# Patient Record
Sex: MALE | Race: OTHER | Employment: FULL TIME | URBAN - METROPOLITAN AREA
[De-identification: names, ages, dates, MRNs, and addresses within clinical notes are randomized per-mention and may not be internally consistent; named-entity substitution may affect disease eponyms.]

---

## 2019-04-24 PROBLEM — K64.9 HEMORRHOIDS: Status: ACTIVE | Noted: 2019-04-24

## 2019-04-24 PROBLEM — Z12.11 SPECIAL SCREENING FOR MALIGNANT NEOPLASMS, COLON: Status: ACTIVE | Noted: 2019-04-24

## 2019-04-24 PROBLEM — Z83.71 FAMILY HISTORY OF COLONIC POLYPS: Status: ACTIVE | Noted: 2019-04-24

## 2019-04-24 PROBLEM — K57.30 DIVERTICULOSIS OF COLON: Status: ACTIVE | Noted: 2019-04-24

## 2019-05-13 PROBLEM — R07.9 CHEST PAIN: Status: ACTIVE | Noted: 2019-05-13

## 2019-05-15 PROBLEM — I34.0 NON-RHEUMATIC MITRAL REGURGITATION: Status: ACTIVE | Noted: 2019-05-15

## 2019-05-15 PROBLEM — I48.92 ATRIAL FLUTTER (HCC): Status: ACTIVE | Noted: 2019-05-15

## 2019-05-17 ENCOUNTER — HOSPITAL ENCOUNTER (OUTPATIENT)
Dept: CARDIAC CATH/INVASIVE PROCEDURES | Age: 69
Discharge: HOME | End: 2019-05-17
Attending: INTERNAL MEDICINE | Admitting: INTERNAL MEDICINE
Payer: COMMERCIAL

## 2019-05-17 VITALS
OXYGEN SATURATION: 98 % | BODY MASS INDEX: 25.44 KG/M2 | WEIGHT: 149 LBS | DIASTOLIC BLOOD PRESSURE: 80 MMHG | HEIGHT: 64 IN | RESPIRATION RATE: 17 BRPM | SYSTOLIC BLOOD PRESSURE: 123 MMHG | HEART RATE: 68 BPM

## 2019-05-17 LAB
ANION GAP SERPL CALC-SCNC: 5 MMOL/L (ref 7–16)
ATRIAL RATE: 293 BPM
BUN SERPL-MCNC: 15 MG/DL (ref 8–23)
CALCIUM SERPL-MCNC: 8.5 MG/DL (ref 8.3–10.4)
CALCULATED P AXIS, ECG09: -2 DEGREES
CALCULATED R AXIS, ECG10: 44 DEGREES
CALCULATED T AXIS, ECG11: 56 DEGREES
CHLORIDE SERPL-SCNC: 107 MMOL/L (ref 98–107)
CO2 SERPL-SCNC: 30 MMOL/L (ref 21–32)
CREAT SERPL-MCNC: 0.83 MG/DL (ref 0.8–1.5)
DIAGNOSIS, 93000: NORMAL
ERYTHROCYTE [DISTWIDTH] IN BLOOD BY AUTOMATED COUNT: 15.3 % (ref 11.9–14.6)
GLUCOSE SERPL-MCNC: 88 MG/DL (ref 65–100)
HCT VFR BLD AUTO: 47.5 % (ref 41.1–50.3)
HGB BLD-MCNC: 15.4 G/DL (ref 13.6–17.2)
INR PPP: 1
MAGNESIUM SERPL-MCNC: 2.4 MG/DL (ref 1.8–2.4)
MCH RBC QN AUTO: 28.5 PG (ref 26.1–32.9)
MCHC RBC AUTO-ENTMCNC: 32.4 G/DL (ref 31.4–35)
MCV RBC AUTO: 88 FL (ref 79.6–97.8)
NRBC # BLD: 0 K/UL (ref 0–0.2)
PLATELET # BLD AUTO: 270 K/UL (ref 150–450)
PMV BLD AUTO: 10.9 FL (ref 9.4–12.3)
POTASSIUM SERPL-SCNC: 3.8 MMOL/L (ref 3.5–5.1)
PROTHROMBIN TIME: 12.8 SEC (ref 11.7–14.5)
Q-T INTERVAL, ECG07: 410 MS
QRS DURATION, ECG06: 88 MS
QTC CALCULATION (BEZET), ECG08: 419 MS
RBC # BLD AUTO: 5.4 M/UL (ref 4.23–5.6)
SODIUM SERPL-SCNC: 142 MMOL/L (ref 136–145)
VENTRICULAR RATE, ECG03: 63 BPM
WBC # BLD AUTO: 8.1 K/UL (ref 4.3–11.1)

## 2019-05-17 PROCEDURE — 85610 PROTHROMBIN TIME: CPT

## 2019-05-17 PROCEDURE — 74011250637 HC RX REV CODE- 250/637: Performed by: INTERNAL MEDICINE

## 2019-05-17 PROCEDURE — 74011000250 HC RX REV CODE- 250: Performed by: INTERNAL MEDICINE

## 2019-05-17 PROCEDURE — 85027 COMPLETE CBC AUTOMATED: CPT

## 2019-05-17 PROCEDURE — 99153 MOD SED SAME PHYS/QHP EA: CPT

## 2019-05-17 PROCEDURE — 93312 ECHO TRANSESOPHAGEAL: CPT

## 2019-05-17 PROCEDURE — 74011250636 HC RX REV CODE- 250/636: Performed by: INTERNAL MEDICINE

## 2019-05-17 PROCEDURE — 93460 R&L HRT ART/VENTRICLE ANGIO: CPT

## 2019-05-17 PROCEDURE — 80048 BASIC METABOLIC PNL TOTAL CA: CPT

## 2019-05-17 PROCEDURE — 93005 ELECTROCARDIOGRAM TRACING: CPT | Performed by: INTERNAL MEDICINE

## 2019-05-17 PROCEDURE — 93458 L HRT ARTERY/VENTRICLE ANGIO: CPT

## 2019-05-17 PROCEDURE — 74011250636 HC RX REV CODE- 250/636

## 2019-05-17 PROCEDURE — 74011636320 HC RX REV CODE- 636/320: Performed by: INTERNAL MEDICINE

## 2019-05-17 PROCEDURE — 99152 MOD SED SAME PHYS/QHP 5/>YRS: CPT

## 2019-05-17 PROCEDURE — 83735 ASSAY OF MAGNESIUM: CPT

## 2019-05-17 RX ORDER — SODIUM CHLORIDE 9 MG/ML
75 INJECTION, SOLUTION INTRAVENOUS CONTINUOUS
Status: DISCONTINUED | OUTPATIENT
Start: 2019-05-17 | End: 2019-05-17 | Stop reason: HOSPADM

## 2019-05-17 RX ORDER — SODIUM CHLORIDE 0.9 % (FLUSH) 0.9 %
5-40 SYRINGE (ML) INJECTION EVERY 8 HOURS
Status: DISCONTINUED | OUTPATIENT
Start: 2019-05-17 | End: 2019-05-17 | Stop reason: HOSPADM

## 2019-05-17 RX ORDER — LIDOCAINE HYDROCHLORIDE 10 MG/ML
2-20 INJECTION INFILTRATION; PERINEURAL
Status: DISCONTINUED | OUTPATIENT
Start: 2019-05-17 | End: 2019-05-17 | Stop reason: HOSPADM

## 2019-05-17 RX ORDER — FENTANYL CITRATE 50 UG/ML
25-100 INJECTION, SOLUTION INTRAMUSCULAR; INTRAVENOUS
Status: DISCONTINUED | OUTPATIENT
Start: 2019-05-17 | End: 2019-05-17 | Stop reason: HOSPADM

## 2019-05-17 RX ORDER — MIDAZOLAM HYDROCHLORIDE 1 MG/ML
.5-2 INJECTION, SOLUTION INTRAMUSCULAR; INTRAVENOUS
Status: DISCONTINUED | OUTPATIENT
Start: 2019-05-17 | End: 2019-05-17 | Stop reason: HOSPADM

## 2019-05-17 RX ORDER — SODIUM CHLORIDE 0.9 % (FLUSH) 0.9 %
5 SYRINGE (ML) INJECTION AS NEEDED
Status: DISCONTINUED | OUTPATIENT
Start: 2019-05-17 | End: 2019-05-17 | Stop reason: HOSPADM

## 2019-05-17 RX ORDER — SODIUM CHLORIDE 0.9 % (FLUSH) 0.9 %
5-40 SYRINGE (ML) INJECTION AS NEEDED
Status: DISCONTINUED | OUTPATIENT
Start: 2019-05-17 | End: 2019-05-17 | Stop reason: HOSPADM

## 2019-05-17 RX ORDER — GUAIFENESIN 100 MG/5ML
324 LIQUID (ML) ORAL ONCE
Status: COMPLETED | OUTPATIENT
Start: 2019-05-17 | End: 2019-05-17

## 2019-05-17 RX ORDER — HEPARIN SODIUM 200 [USP'U]/100ML
2 INJECTION, SOLUTION INTRAVENOUS CONTINUOUS
Status: DISCONTINUED | OUTPATIENT
Start: 2019-05-17 | End: 2019-05-17 | Stop reason: HOSPADM

## 2019-05-17 RX ADMIN — LIDOCAINE HYDROCHLORIDE 4 ML: 10 INJECTION, SOLUTION INFILTRATION; PERINEURAL at 13:15

## 2019-05-17 RX ADMIN — LIDOCAINE HYDROCHLORIDE 5 ML: 10 INJECTION, SOLUTION INFILTRATION; PERINEURAL at 13:21

## 2019-05-17 RX ADMIN — HEPARIN SODIUM 2 ML/HR: 5000 INJECTION, SOLUTION INTRAVENOUS; SUBCUTANEOUS at 13:11

## 2019-05-17 RX ADMIN — VERAPAMIL HYDROCHLORIDE 2 ML: 2.5 INJECTION, SOLUTION INTRAVENOUS at 13:22

## 2019-05-17 RX ADMIN — MIDAZOLAM HYDROCHLORIDE 1 MG: 1 INJECTION, SOLUTION INTRAMUSCULAR; INTRAVENOUS at 13:23

## 2019-05-17 RX ADMIN — MIDAZOLAM HYDROCHLORIDE 1 MG: 1 INJECTION, SOLUTION INTRAMUSCULAR; INTRAVENOUS at 12:31

## 2019-05-17 RX ADMIN — MIDAZOLAM HYDROCHLORIDE 1 MG: 1 INJECTION, SOLUTION INTRAMUSCULAR; INTRAVENOUS at 12:39

## 2019-05-17 RX ADMIN — IOPAMIDOL 110 ML: 755 INJECTION, SOLUTION INTRAVENOUS at 13:46

## 2019-05-17 RX ADMIN — FENTANYL CITRATE 25 MCG: 50 INJECTION, SOLUTION INTRAMUSCULAR; INTRAVENOUS at 13:23

## 2019-05-17 RX ADMIN — FENTANYL CITRATE 25 MCG: 50 INJECTION, SOLUTION INTRAMUSCULAR; INTRAVENOUS at 12:31

## 2019-05-17 RX ADMIN — MIDAZOLAM HYDROCHLORIDE 2 MG: 1 INJECTION, SOLUTION INTRAMUSCULAR; INTRAVENOUS at 12:28

## 2019-05-17 RX ADMIN — FENTANYL CITRATE 25 MCG: 50 INJECTION, SOLUTION INTRAMUSCULAR; INTRAVENOUS at 12:28

## 2019-05-17 RX ADMIN — SODIUM CHLORIDE 75 ML/HR: 900 INJECTION, SOLUTION INTRAVENOUS at 10:46

## 2019-05-17 RX ADMIN — ASPIRIN 81 MG 324 MG: 81 TABLET ORAL at 10:48

## 2019-05-17 NOTE — PROGRESS NOTES
Patient received to [address] [place] [address] room # 10  Ambulatory from [facility]. Patient scheduled for ROB/ LHC today with Dr Gayathri Lucero. Procedure reviewed & questions answered, voiced good understanding consent obtained & placed on chart. All medications and medical history reviewed. Will prep patient per orders. Patient & family updated on plan of care. The patient has a fraility score of 3-MANAGING WELL, based on ability to perform ADLS by self

## 2019-05-17 NOTE — PROCEDURES
Brief Cardiac Procedure Note    Patient: Yasmine Lua MRN: [id number]  SSN: [id number]    YOB: 1950  Age: 81 y.o. Sex: male      Date of Procedure: 5/17/2019     Pre-procedure Diagnosis: Mitral Valve Disorder    Post-procedure Diagnosis: Mitral Valve Disorder    Reason for Procedure: Valvular Disease    Procedure: Transesophageal Echocardiogram    Brief Description of Procedure: ROB    Performed By: Julia Ureña MD     Assistants: NONE    Anesthesia: Moderate Sedation    Estimated Blood Loss: Less than 10 mL      Specimens: None    Implants: None    Findings:   LV LOW NORMAL  TRACE AI AND TR  MODERATE VERY ECCENTRIC ANTEROMEDIALLY DIRECTED MR WITH RUPTURED CHORD BUT NO FLAIL LEAFLET NOTED, MR MODERATE AT MOST WITH SEDATION  PISA CALCULATIONS PENDING  NO EFFUSION  AO NORMAL    Complications: None    Recommendations: Continue medical therapy.     Signed By: Julia Ureña MD     May 17, 2019

## 2019-05-17 NOTE — PROGRESS NOTES
7 fr sheath pulled manual pressure applied. Hemostasis achieved. No bleeding or hematoma. Gauze and tegaderm applied. Right wrist no bleeding or hematoma. 2ml air removed from band

## 2019-05-17 NOTE — PROGRESS NOTES
TRANSFER - OUT REPORT: 
 
ROB Dr Leida Giraldo Numbed approx 1220 Versed 4 mg Fentanyl 50 mcg Pt tolerated well Pt is alert to voice denies complaints Verbal report given to Elsie(name) on [facility]  being transferred to [facility]) for ordered procedure Report consisted of patients Situation, Background, Assessment and  
Recommendations(SBAR). Information from the following report(s) SBAR and Procedure Summary was reviewed with the receiving nurse. Lines:  
Peripheral IV 05/17/19 Right Antecubital (Active) Peripheral IV 05/17/19 Left Antecubital (Active) Opportunity for questions and clarification was provided.

## 2019-05-17 NOTE — PROCEDURES
Brief Cardiac Procedure Note    Patient: Riley Nix MRN: [id number]  SSN: [id number]    YOB: 1950  Age: 81 y.o. Sex: male      Date of Procedure: 5/17/2019     Pre-procedure Diagnosis: Mitral Valve Disorder    Post-procedure Diagnosis: Mitral Valve Disorder    Reason for Procedure: Valvular Disease    Procedure: Right and Left Heart Catheterization    Brief Description of Procedure: LHC/RHC/ROB    Performed By: Katy Verde MD     Assistants: NONE    Anesthesia: Moderate Sedation    Estimated Blood Loss: Less than 10 mL      Specimens: None    Implants: None    Findings:   ROB WITH POSTERIOR MV CHORD RUPTURE, MODERATE VERY ECCENTRIC ANTEROMEDIALLY DIRECTED MR (SEE FULL REPORT)    RHC UNREMARKABLE (SEE FULL REPORT)  LHC :  EF NORMAL, MODERATE MR, NO AV GRADIENT  CORS MILD DIFFUSE DISEASE, SLOW FLOW CONSISTENT WITH ENDOTHELIAL DYSFUNCTION  RCA LARGE ~ 5-6mm WITH MID SMOOTH 50%, PLB WITH FOCAL MID 50% NAPKIN RING  MILD IRREGS OTHERWISE    RIGHT RADIAL ARTERY  RIGHT GROIN VEIN WITH SITE RITE, SINGLE STICK    Complications: None    Recommendations: Continue medical therapy.     Signed By: Katy Verde MD     May 17, 2019

## 2019-05-17 NOTE — DISCHARGE INSTRUCTIONS

## 2019-05-18 NOTE — PROCEDURES
[facility]  CARDIAC CATH    Name:  Kierra Wiseman  MR#:  [id number]  :  1950  ACCOUNT #:  [de-identified]  DATE OF SERVICE:  2019      REFERRING PHYSICIAN:  Kya Adams MD    PRIMARY CARE PHYSICIAN:  Adriana Whaley DO    PREOPERATIVE DIAGNOSIS:  Persistent typical atrial flutter with new diagnosis of moderate to severe mitral regurgitation. POSTOPERATIVE DIAGNOSIS:  Ruptured mitral valve posterior leaflet cord with moderate mitral regurgitation and mild to moderate coronary disease. PROCEDURE PERFORMED:  Transesophageal echo, left heart catheterization, right heart catheterization. SURGEON:  Anjel Chinchilla MD    ASSISTANT:  None. ANESTHESIA:  The patient was sedated by Maureen Blunt with a frailty score of 3 using 1 mg Versed, 25 mcg fentanyl and monitored from 12:28 to 01:47 p.m. COMPLICATIONS:  None. SPECIMENS:  None. ESTIMATED BLOOD LOSS:  Less than 3 mL. IMPLANTS:  None. PROCEDURE TECHNIQUE:  After transesophageal echo was performed demonstrating a probable posterior mitral leaflet ruptured cordal structure with moderate very eccentric anteromedially directed mitral regurgitation, the patient was brought to the cath lab, prepped and draped in the usual fashion. A 6-Guatemalan sheath was advanced into the right radial artery via the micropuncture modified Seldinger technique and a Site-Rite was used to localize the right femoral vein with a single stick inserting a 7-Guatemalan vascular sheath via modified Seldinger technique. Left heart catheterization was performed using standard catheters without complication. Manual pressure will be applied to both access sites via protocol. For a full transesophageal echo report, please see the dictation in the computer. RIGHT HEART CATHETERIZATION RESULTS:  Pulmonary artery saturation 68%. Right atrial saturation 68%. Aortic saturation 95%. Cardiac output 3.6 liters per minute.   Cardiac index 2.1 liters per minute per meter squared. Yamileth cardiac output 3.7 liters per minute, Yamileth cardiac index 2.1 liters per minute per meter squared. Pulmonary capillary wedge pressure end-expiration 15. Pulmonary artery pressure 33/18 with a mean of 23. Right ventricle 33/12. Right atrium 10-12. LEFT HEART CATHETERIZATION RESULTS:  Left ventricle 100/10-13, aorta 100/70. Left ventriculogram reveals normal left ventricular regional wall motion with an ejection fraction greater than 60%. There is moderate mitral regurgitation. There is no aortic valve gradient on catheter pullback and left ventricular end-diastolic pressure is normal.    Coronary anatomy:  Of note, there is sluggish flow throughout the entire epicardial coronary tree consistent with endothelial dysfunction. The left main has mild irregularity dividing into an LAD and circumflex in the usual fashion. The LAD wraps around the apex and supplies a high first diagonal.  There are mild luminal irregularities throughout the entire LAD and diagonal distribution up to 10%. The circumflex is a moderate caliber system which gives off a high first obtuse marginal branch and then a bifurcating second obtuse marginal branch. There are minimal luminal irregularities throughout. There is a small ramus intermedius branch with proximal to mid 30% irregularity. The right coronary is a large anatomically dominant vessel which is at least 5-6 mm in diameter. There is a focal smooth 40-50% napkin-ring narrowing in the midportion of the right coronary proper, however, the lumen at the site of the stenosis is still probably 3 mm in diameter at least.  This is not flow limiting. The remainder of the right coronary has minimal irregularities. There is a small posterior descending branch which has minimal disease. There is a very large posterolateral branch which has mild ectasia.   There is a focal napkin-ring 40-50% mid vessel lesion but again, this does not appear to be flow limiting in multiple views and best left to medical therapy. CONCLUSIONS:  1. Ruptured mitral valve cordal structure with anteromedially directed moderate mitral regurgitation by transesophageal echo. 2.  Mild-to-moderate focal disease in the mid RCA and mid posterior descending branch of the right coronary as described above. 3.  Preserved ejection fraction.       Iraida Jose MD      [id number]  D:  05/17/2019 14:05  T:  05/17/2019 14:16  JOB #:  [id number]  CC:  MD Frankie Lieberman DO

## 2019-07-22 ENCOUNTER — ANESTHESIA EVENT (OUTPATIENT)
Dept: SURGERY | Age: 69
End: 2019-07-22
Payer: COMMERCIAL

## 2019-07-22 RX ORDER — SODIUM CHLORIDE 9 MG/ML
50 INJECTION, SOLUTION INTRAVENOUS CONTINUOUS
Status: CANCELLED | OUTPATIENT
Start: 2019-07-22

## 2019-07-22 RX ORDER — HYDROCODONE BITARTRATE AND ACETAMINOPHEN 5; 325 MG/1; MG/1
1 TABLET ORAL AS NEEDED
Status: CANCELLED | OUTPATIENT
Start: 2019-07-22

## 2019-07-22 RX ORDER — ACETAMINOPHEN 500 MG
1000 TABLET ORAL
Status: CANCELLED | OUTPATIENT
Start: 2019-07-22

## 2019-07-22 RX ORDER — SODIUM CHLORIDE, SODIUM LACTATE, POTASSIUM CHLORIDE, CALCIUM CHLORIDE 600; 310; 30; 20 MG/100ML; MG/100ML; MG/100ML; MG/100ML
150 INJECTION, SOLUTION INTRAVENOUS CONTINUOUS
Status: CANCELLED | OUTPATIENT
Start: 2019-07-22

## 2019-07-22 RX ORDER — HYDROMORPHONE HYDROCHLORIDE 2 MG/ML
0.5 INJECTION, SOLUTION INTRAMUSCULAR; INTRAVENOUS; SUBCUTANEOUS
Status: CANCELLED | OUTPATIENT
Start: 2019-07-22

## 2019-07-23 ENCOUNTER — HOSPITAL ENCOUNTER (OUTPATIENT)
Age: 69
Setting detail: OUTPATIENT SURGERY
Discharge: HOME | End: 2019-07-23
Attending: INTERNAL MEDICINE | Admitting: INTERNAL MEDICINE
Payer: COMMERCIAL

## 2019-07-23 ENCOUNTER — ANESTHESIA (OUTPATIENT)
Dept: SURGERY | Age: 69
End: 2019-07-23
Payer: COMMERCIAL

## 2019-07-23 ENCOUNTER — HOSPITAL ENCOUNTER (OUTPATIENT)
Dept: CARDIAC CATH/INVASIVE PROCEDURES | Age: 69
Discharge: HOME | End: 2019-07-23
Payer: COMMERCIAL

## 2019-07-23 VITALS
OXYGEN SATURATION: 98 % | TEMPERATURE: 98 F | RESPIRATION RATE: 13 BRPM | WEIGHT: 148 LBS | SYSTOLIC BLOOD PRESSURE: 145 MMHG | DIASTOLIC BLOOD PRESSURE: 70 MMHG | HEIGHT: 64 IN | BODY MASS INDEX: 25.27 KG/M2 | HEART RATE: 88 BPM

## 2019-07-23 LAB
ANION GAP SERPL CALC-SCNC: 7 MMOL/L (ref 7–16)
ATRIAL RATE: 284 BPM
ATRIAL RATE: 81 BPM
BUN SERPL-MCNC: 15 MG/DL (ref 8–23)
CALCIUM SERPL-MCNC: 8.5 MG/DL (ref 8.3–10.4)
CALCULATED P AXIS, ECG09: 70 DEGREES
CALCULATED P AXIS, ECG09: 80 DEGREES
CALCULATED R AXIS, ECG10: 55 DEGREES
CALCULATED R AXIS, ECG10: 7 DEGREES
CALCULATED T AXIS, ECG11: 62 DEGREES
CALCULATED T AXIS, ECG11: 70 DEGREES
CHLORIDE SERPL-SCNC: 108 MMOL/L (ref 98–107)
CO2 SERPL-SCNC: 29 MMOL/L (ref 21–32)
CREAT SERPL-MCNC: 0.9 MG/DL (ref 0.8–1.5)
DIAGNOSIS, 93000: NORMAL
DIAGNOSIS, 93000: NORMAL
ERYTHROCYTE [DISTWIDTH] IN BLOOD BY AUTOMATED COUNT: 13.8 % (ref 11.9–14.6)
GLUCOSE SERPL-MCNC: 81 MG/DL (ref 65–100)
HCT VFR BLD AUTO: 50.8 % (ref 41.1–50.3)
HGB BLD-MCNC: 16.1 G/DL (ref 13.6–17.2)
INR PPP: 1.1
MAGNESIUM SERPL-MCNC: 2.3 MG/DL (ref 1.8–2.4)
MCH RBC QN AUTO: 28.5 PG (ref 26.1–32.9)
MCHC RBC AUTO-ENTMCNC: 31.7 G/DL (ref 31.4–35)
MCV RBC AUTO: 90.1 FL (ref 79.6–97.8)
NRBC # BLD: 0 K/UL (ref 0–0.2)
P-R INTERVAL, ECG05: 226 MS
PLATELET # BLD AUTO: 232 K/UL (ref 150–450)
PMV BLD AUTO: 11.7 FL (ref 9.4–12.3)
POTASSIUM SERPL-SCNC: 3.8 MMOL/L (ref 3.5–5.1)
PROTHROMBIN TIME: 13.7 SEC (ref 11.7–14.5)
Q-T INTERVAL, ECG07: 378 MS
Q-T INTERVAL, ECG07: 398 MS
QRS DURATION, ECG06: 86 MS
QRS DURATION, ECG06: 90 MS
QTC CALCULATION (BEZET), ECG08: 432 MS
QTC CALCULATION (BEZET), ECG08: 439 MS
RBC # BLD AUTO: 5.64 M/UL (ref 4.23–5.6)
SODIUM SERPL-SCNC: 144 MMOL/L (ref 136–145)
VENTRICULAR RATE, ECG03: 71 BPM
VENTRICULAR RATE, ECG03: 81 BPM
WBC # BLD AUTO: 7.4 K/UL (ref 4.3–11.1)

## 2019-07-23 PROCEDURE — 93005 ELECTROCARDIOGRAM TRACING: CPT | Performed by: INTERNAL MEDICINE

## 2019-07-23 PROCEDURE — 85027 COMPLETE CBC AUTOMATED: CPT

## 2019-07-23 PROCEDURE — 74011250637 HC RX REV CODE- 250/637: Performed by: ANESTHESIOLOGY

## 2019-07-23 PROCEDURE — 77030035291 HC TBNG PMP SMARTABLATE J&J -B

## 2019-07-23 PROCEDURE — 77030027107 HC PTCH EXT REF CARTO3 J&J -F

## 2019-07-23 PROCEDURE — 83735 ASSAY OF MAGNESIUM: CPT

## 2019-07-23 PROCEDURE — C1732 CATH, EP, DIAG/ABL, 3D/VECT: HCPCS

## 2019-07-23 PROCEDURE — 74011250636 HC RX REV CODE- 250/636: Performed by: INTERNAL MEDICINE

## 2019-07-23 PROCEDURE — 74011250636 HC RX REV CODE- 250/636

## 2019-07-23 PROCEDURE — C1894 INTRO/SHEATH, NON-LASER: HCPCS

## 2019-07-23 PROCEDURE — 93653 COMPRE EP EVAL TX SVT: CPT

## 2019-07-23 PROCEDURE — 74011250637 HC RX REV CODE- 250/637: Performed by: INTERNAL MEDICINE

## 2019-07-23 PROCEDURE — 93312 ECHO TRANSESOPHAGEAL: CPT

## 2019-07-23 PROCEDURE — 85610 PROTHROMBIN TIME: CPT

## 2019-07-23 PROCEDURE — 93613 INTRACARDIAC EPHYS 3D MAPG: CPT

## 2019-07-23 PROCEDURE — 76060000033 HC ANESTHESIA 1 TO 1.5 HR: Performed by: INTERNAL MEDICINE

## 2019-07-23 PROCEDURE — 93621 COMP EP EVL L PAC&REC C SINS: CPT

## 2019-07-23 PROCEDURE — 80048 BASIC METABOLIC PNL TOTAL CA: CPT

## 2019-07-23 RX ORDER — HEPARIN SODIUM 200 [USP'U]/100ML
1500 INJECTION, SOLUTION INTRAVENOUS AS NEEDED
Status: DISCONTINUED | OUTPATIENT
Start: 2019-07-23 | End: 2019-07-23 | Stop reason: HOSPADM

## 2019-07-23 RX ORDER — LIDOCAINE HYDROCHLORIDE 10 MG/ML
0.1 INJECTION INFILTRATION; PERINEURAL AS NEEDED
Status: DISCONTINUED | OUTPATIENT
Start: 2019-07-23 | End: 2019-07-23 | Stop reason: HOSPADM

## 2019-07-23 RX ORDER — ACETAMINOPHEN 325 MG/1
650 TABLET ORAL
Status: DISCONTINUED | OUTPATIENT
Start: 2019-07-23 | End: 2019-07-23 | Stop reason: HOSPADM

## 2019-07-23 RX ORDER — DILTIAZEM HYDROCHLORIDE 120 MG/1
120 CAPSULE, COATED, EXTENDED RELEASE ORAL ONCE
Status: COMPLETED | OUTPATIENT
Start: 2019-07-23 | End: 2019-07-23

## 2019-07-23 RX ORDER — SODIUM CHLORIDE, SODIUM LACTATE, POTASSIUM CHLORIDE, CALCIUM CHLORIDE 600; 310; 30; 20 MG/100ML; MG/100ML; MG/100ML; MG/100ML
INJECTION, SOLUTION INTRAVENOUS
Status: DISCONTINUED | OUTPATIENT
Start: 2019-07-23 | End: 2019-07-23 | Stop reason: HOSPADM

## 2019-07-23 RX ORDER — HEPARIN SODIUM 1000 [USP'U]/ML
INJECTION, SOLUTION INTRAVENOUS; SUBCUTANEOUS AS NEEDED
Status: DISCONTINUED | OUTPATIENT
Start: 2019-07-23 | End: 2019-07-23 | Stop reason: HOSPADM

## 2019-07-23 RX ORDER — FENTANYL CITRATE 50 UG/ML
100 INJECTION, SOLUTION INTRAMUSCULAR; INTRAVENOUS ONCE
Status: DISCONTINUED | OUTPATIENT
Start: 2019-07-23 | End: 2019-07-23 | Stop reason: HOSPADM

## 2019-07-23 RX ORDER — LIDOCAINE HYDROCHLORIDE 20 MG/ML
INJECTION, SOLUTION EPIDURAL; INFILTRATION; INTRACAUDAL; PERINEURAL AS NEEDED
Status: DISCONTINUED | OUTPATIENT
Start: 2019-07-23 | End: 2019-07-23 | Stop reason: HOSPADM

## 2019-07-23 RX ORDER — ONDANSETRON 2 MG/ML
INJECTION INTRAMUSCULAR; INTRAVENOUS AS NEEDED
Status: DISCONTINUED | OUTPATIENT
Start: 2019-07-23 | End: 2019-07-23 | Stop reason: HOSPADM

## 2019-07-23 RX ORDER — FENTANYL CITRATE 50 UG/ML
INJECTION, SOLUTION INTRAMUSCULAR; INTRAVENOUS AS NEEDED
Status: DISCONTINUED | OUTPATIENT
Start: 2019-07-23 | End: 2019-07-23 | Stop reason: HOSPADM

## 2019-07-23 RX ORDER — SODIUM CHLORIDE, SODIUM LACTATE, POTASSIUM CHLORIDE, CALCIUM CHLORIDE 600; 310; 30; 20 MG/100ML; MG/100ML; MG/100ML; MG/100ML
150 INJECTION, SOLUTION INTRAVENOUS CONTINUOUS
Status: DISCONTINUED | OUTPATIENT
Start: 2019-07-23 | End: 2019-07-23 | Stop reason: HOSPADM

## 2019-07-23 RX ORDER — PROPOFOL 10 MG/ML
INJECTION, EMULSION INTRAVENOUS AS NEEDED
Status: DISCONTINUED | OUTPATIENT
Start: 2019-07-23 | End: 2019-07-23 | Stop reason: HOSPADM

## 2019-07-23 RX ORDER — SODIUM CHLORIDE 0.9 % (FLUSH) 0.9 %
5-40 SYRINGE (ML) INJECTION EVERY 8 HOURS
Status: DISCONTINUED | OUTPATIENT
Start: 2019-07-23 | End: 2019-07-23 | Stop reason: HOSPADM

## 2019-07-23 RX ORDER — FAMOTIDINE 20 MG/1
20 TABLET, FILM COATED ORAL ONCE
Status: COMPLETED | OUTPATIENT
Start: 2019-07-23 | End: 2019-07-23

## 2019-07-23 RX ORDER — MIDAZOLAM HYDROCHLORIDE 1 MG/ML
2 INJECTION, SOLUTION INTRAMUSCULAR; INTRAVENOUS
Status: DISCONTINUED | OUTPATIENT
Start: 2019-07-23 | End: 2019-07-23 | Stop reason: HOSPADM

## 2019-07-23 RX ORDER — SODIUM CHLORIDE 0.9 % (FLUSH) 0.9 %
5-40 SYRINGE (ML) INJECTION AS NEEDED
Status: DISCONTINUED | OUTPATIENT
Start: 2019-07-23 | End: 2019-07-23 | Stop reason: HOSPADM

## 2019-07-23 RX ORDER — PROPOFOL 10 MG/ML
INJECTION, EMULSION INTRAVENOUS
Status: DISCONTINUED | OUTPATIENT
Start: 2019-07-23 | End: 2019-07-23 | Stop reason: HOSPADM

## 2019-07-23 RX ORDER — SODIUM CHLORIDE, SODIUM LACTATE, POTASSIUM CHLORIDE, CALCIUM CHLORIDE 600; 310; 30; 20 MG/100ML; MG/100ML; MG/100ML; MG/100ML
75 INJECTION, SOLUTION INTRAVENOUS CONTINUOUS
Status: DISCONTINUED | OUTPATIENT
Start: 2019-07-23 | End: 2019-07-23 | Stop reason: HOSPADM

## 2019-07-23 RX ORDER — DOCUSATE SODIUM 100 MG/1
100 CAPSULE, LIQUID FILLED ORAL
Status: DISCONTINUED | OUTPATIENT
Start: 2019-07-23 | End: 2019-07-23 | Stop reason: HOSPADM

## 2019-07-23 RX ORDER — HYDROCODONE BITARTRATE AND ACETAMINOPHEN 5; 325 MG/1; MG/1
1 TABLET ORAL
Status: DISCONTINUED | OUTPATIENT
Start: 2019-07-23 | End: 2019-07-23 | Stop reason: HOSPADM

## 2019-07-23 RX ORDER — LIDOCAINE HYDROCHLORIDE 10 MG/ML
10 INJECTION INFILTRATION; PERINEURAL
Status: DISCONTINUED | OUTPATIENT
Start: 2019-07-23 | End: 2019-07-23 | Stop reason: HOSPADM

## 2019-07-23 RX ORDER — DEXAMETHASONE SODIUM PHOSPHATE 4 MG/ML
INJECTION, SOLUTION INTRA-ARTICULAR; INTRALESIONAL; INTRAMUSCULAR; INTRAVENOUS; SOFT TISSUE AS NEEDED
Status: DISCONTINUED | OUTPATIENT
Start: 2019-07-23 | End: 2019-07-23 | Stop reason: HOSPADM

## 2019-07-23 RX ORDER — ONDANSETRON 2 MG/ML
4 INJECTION INTRAMUSCULAR; INTRAVENOUS
Status: DISCONTINUED | OUTPATIENT
Start: 2019-07-23 | End: 2019-07-23 | Stop reason: HOSPADM

## 2019-07-23 RX ADMIN — SODIUM CHLORIDE, SODIUM LACTATE, POTASSIUM CHLORIDE, CALCIUM CHLORIDE: 600; 310; 30; 20 INJECTION, SOLUTION INTRAVENOUS at 10:09

## 2019-07-23 RX ADMIN — ONDANSETRON 4 MG: 2 INJECTION INTRAMUSCULAR; INTRAVENOUS at 10:25

## 2019-07-23 RX ADMIN — FENTANYL CITRATE 25 MCG: 50 INJECTION, SOLUTION INTRAMUSCULAR; INTRAVENOUS at 10:35

## 2019-07-23 RX ADMIN — HEPARIN SODIUM 3000 UNITS: 200 INJECTION, SOLUTION INTRAVENOUS at 10:11

## 2019-07-23 RX ADMIN — FENTANYL CITRATE 25 MCG: 50 INJECTION, SOLUTION INTRAMUSCULAR; INTRAVENOUS at 11:07

## 2019-07-23 RX ADMIN — FAMOTIDINE 20 MG: 20 TABLET ORAL at 09:59

## 2019-07-23 RX ADMIN — FENTANYL CITRATE 25 MCG: 50 INJECTION, SOLUTION INTRAMUSCULAR; INTRAVENOUS at 10:51

## 2019-07-23 RX ADMIN — HEPARIN SODIUM 3000 UNITS: 1000 INJECTION, SOLUTION INTRAVENOUS; SUBCUTANEOUS at 10:35

## 2019-07-23 RX ADMIN — LIDOCAINE HYDROCHLORIDE 10 ML: 10 INJECTION, SOLUTION INFILTRATION; PERINEURAL at 10:11

## 2019-07-23 RX ADMIN — PROPOFOL 20 MG: 10 INJECTION, EMULSION INTRAVENOUS at 10:17

## 2019-07-23 RX ADMIN — DILTIAZEM HYDROCHLORIDE 120 MG: 120 CAPSULE, COATED, EXTENDED RELEASE ORAL at 09:58

## 2019-07-23 RX ADMIN — DEXAMETHASONE SODIUM PHOSPHATE 4 MG: 4 INJECTION, SOLUTION INTRA-ARTICULAR; INTRALESIONAL; INTRAMUSCULAR; INTRAVENOUS; SOFT TISSUE at 10:25

## 2019-07-23 RX ADMIN — PROPOFOL 100 MCG/KG/MIN: 10 INJECTION, EMULSION INTRAVENOUS at 10:15

## 2019-07-23 RX ADMIN — LIDOCAINE HYDROCHLORIDE 80 MG: 20 INJECTION, SOLUTION EPIDURAL; INFILTRATION; INTRACAUDAL; PERINEURAL at 10:15

## 2019-07-23 RX ADMIN — PROPOFOL 20 MG: 10 INJECTION, EMULSION INTRAVENOUS at 10:15

## 2019-07-23 RX ADMIN — FENTANYL CITRATE 25 MCG: 50 INJECTION, SOLUTION INTRAMUSCULAR; INTRAVENOUS at 10:11

## 2019-07-23 NOTE — PROGRESS NOTES
Patient received to [address] [place] [address] room # 11  Ambulatory from [facility]. Patient scheduled for A-Flutter ablation today with Dr Eri Cazares. Procedure reviewed & questions answered, voiced good understanding consent obtained & placed on chart. All medications and medical history reviewed. Will prep patient per orders. Patient & family updated on plan of care.       The patient has a fraility score of 3-MANAGING WELL, based on reports history of A-Flutter

## 2019-07-23 NOTE — PROCEDURES
Attending: Nissa Gutierrez. Kenyon Marinelli MD    Referring: Robbi Vegas MD      Pre-Electrophysiology Diagnosis  1. Typical atrial flutter.     Procedure Performed  1. Electrophysiology testing with right-sided atrial flutter ablation. 2. Left atrial pacing recording from the coronary sinus. 3. 3-D Electroanatomical mapping  4. Transesophageal echo      Anesthesia: MAC     Estimated Blood Loss: Less than 10 mL     Specimens: * No specimens in log *    Complications: None    Fluoroscopy Time: 1.5 minutes/ 50 mGy. Procedure in Detail:  The patient was brought to the electrophysiology lab in the fasting state. A Ref-Star CARTO patch was placed, the patient was then prepped and draped in sterile fashion. A transesophageal echocardiogram was performed prior to the procedure and was negative for an RYDER thrombus (see full report in the chart). Venous access was then obtained x2 using modified Seldinger technique under ultrasound guidance, with placement of 2 short sidearm sheaths into the right femoral vein. A 3.5 mm Aviasalester porous irrigated [facility] ablation catheter was inserted into one of the 8F sheaths and was used to create an electroanatomical map of the right atrium focusing on the cavotricuspid isthmus and into the coronary sinus. The ablation catheter was used to record intracardiac electrograms along the lateral wall of the right atrium and the His electrogram.  A multipolar catheter was then inserted via an 8 Fr sheath and positioned in the mid coronary sinus. The patient presented to the EP lab in the clinical arrhythmia with pre-procedural concern for a right atrial flutter. After right atrial and coronary sinus electrograms were obtained, it was clear the majority of the cycle length was accounted for with a counterclockwise activation pattern consistent with CTI dependent atrial flutter.   Entrainment was performed with proximal CS pacing and lateral TV pacing revealing concealed entrainment with a PPI-TCL of ~60 msec. Even though entrainment maneuvers were not consistent with typical AFL, the arrhythmia looked most similar to typical AFL so a CTI ablation was performed. RF ablation was performed during the clinical tachycardia. Linear ablation across the cavo-tricuspid isthmus was performed starting with 1:2 A:V EGMs along the isthmus at 6pm Swedish. During delivery of RF, the arrhythmia terminated and further ablation was performed to obtain bidirectional block. The local electrogram activation sequence, differential pacing maneuvers and electrogram timing was used to demonstrate bidirectional block along the cavotricuspid isthmus with further ablation. Tachycardia cycle length: 230 msec  Local double potential atrial electrograms: 90 msec  Trans-isthmus time post ablation: 160 msec    The coronary sinus multipolar catheter was used to pace the left atrium during the EP study. The LA CS electrograms were documented and interpreted during the procedure. A comprehensive EP study was performed with 1:1 AV decremental pacing, atrial extrastimuli and ventricular pacing to assess retrograde conduction. The patient did not have sustained slow pathway conduction or evidence of an accessory pathway. Ventricular pacing revealed retrograde VA conduction which was concentric and decremental.    Baseline Intervals    QRS duration: 72 msec  WV interval: 206 msec  RR interval: 827 msec  AH interval: 109 msec  HV interval: 47 msec    EP Study    AV Wenchebach: 460 msec  AV jluian ERP: 600/370 msec  VA Wen[place]bach: >600 msec    Figure 1. 3D electroanatomic map of the right atrium. Red/blue dots demonstrate CTI ablation. Yellow dots demonstrate the region of the Bundle of His. At the completion of the final comprehensive EP study, all catheters were removed, and sheaths pulled. The patient tolerated the procedure well with no acute complications recognized. Plan of care:  The patient will be placed in observation on telemetry, 4 hour flat time, followed by ambulation as tolerated and will continue anticoagulation as prescribed pre-procedure. Complications: None    Summary:   1. Successful ablation of clockwise RA flutter. 2. Creation of a line of bidirectional block at the cavotricuspid isthmus. 3.         Comprehensive EP study. 4. Pt tolerated the procedure well. 5. Family updated. Plan:  -Bedrest for 4 hours.   -Discharge today if stable. -Continue current medicines including Xarelto until EP follow up in 1 month. Bc Cerda.  Kaya Donato MD, ite Lito [address]  Clinical Cardiac Electrophysiology  [facility] Cardiology  7/23/2019  11:24 AM

## 2019-07-23 NOTE — PROGRESS NOTES
Discharge instructions given per orders, voiced good understanding of post procedure care, medications & follow up care.  at bedside.

## 2019-07-23 NOTE — ANESTHESIA POSTPROCEDURE EVALUATION
Procedure(s):  AFLUTTER  ABLATION .    total IV anesthesia    Anesthesia Post Evaluation      Multimodal analgesia: multimodal analgesia not used between 6 hours prior to anesthesia start to PACU discharge  Patient location during evaluation: bedside  Patient participation: complete - patient participated  Level of consciousness: awake and alert  Pain management: adequate  Airway patency: patent  Anesthetic complications: no  Cardiovascular status: hemodynamically stable  Respiratory status: spontaneous ventilation  Hydration status: euvolemic  Comments: Patient stable and may discharge at this time. Vitals Value Taken Time   /81 7/23/2019  2:40 PM   Temp     Pulse 87 7/23/2019  2:44 PM   Resp 6 7/23/2019  2:44 PM   SpO2 95 % 7/23/2019  2:44 PM   Vitals shown include unvalidated device data.

## 2019-07-23 NOTE — PROGRESS NOTES
present at bedside during assessment with unit nurse and Dr. Jia Ash Mark Anthony Zamora  Patient [email or web address]  c: [phone] / Alondra Duran [address] / Cr, [facility]  [email or web address]. [employer]

## 2019-07-23 NOTE — ANESTHESIA PREPROCEDURE EVALUATION
Anesthetic History   No history of anesthetic complications            Review of Systems / Medical History  Patient summary reviewed, nursing notes reviewed and pertinent labs reviewed    Pulmonary  Within defined limits                 Neuro/Psych   Within defined limits           Cardiovascular    Hypertension        Dysrhythmias : atrial flutter      Exercise tolerance: >4 METS     GI/Hepatic/Renal     GERD: well controlled           Endo/Other  Within defined limits           Other Findings              Physical Exam    Airway  Mallampati: II  TM Distance: 4 - 6 cm  Neck ROM: normal range of motion   Mouth opening: Normal     Cardiovascular    Rhythm: irregular           Dental    Dentition: Caps/crowns     Pulmonary  Breath sounds clear to auscultation               Abdominal         Other Findings            Anesthetic Plan    ASA: 2  Anesthesia type: total IV anesthesia          Induction: Intravenous  Anesthetic plan and risks discussed with: Patient and Spouse      History through the help of a hospital

## 2019-07-23 NOTE — DISCHARGE INSTRUCTIONS
AFlutter Ablation Discharge     1. Check puncture site frequently for swelling or bleeding. If there is any bleeding, lie down and apply pressure over the area with a clean towel or washcloth. Call 911. Notify your doctor for any redness, swelling, drainage, or oozing from the puncture site. Notify your doctor for any fever or chills. 2. If the extremity becomes cold, numb, or painful call [address] Cardiology at [phone].  3. Activity should be limited for the next 48 hours. Climb stairs as little as possible and avoid any stooping, bending, or strenuous activity for 48 hours. No heavy lifting (anything over 10 pounds) for 3 days. 4. You may resume your usual diet. Drink more fluids than usual.  5. Have a responsible person drive you home and stay with you for at least 24 hours after your heart catheterization/angiography. Do not drive for the next 24 hours. 6. You may remove bandage from your Right groin in 24 hours. You may shower in 24 hours. No tub baths, hot tubs, or swimming for 1 week. Do not place any lotions, creams, powders, or ointments over puncture site for 1 week. You may place a clean band-aid over the puncture site each day for 5 days. Change daily. 7. Please continue your medications as prescribed by your physician. I have read the above instructions and have had the opportunity to ask questions.       Patient: ________________________   Date: 7/23/2019    Witness: _______________________   Date: 7/23/2019

## 2019-07-23 NOTE — PROGRESS NOTES
TRANSFER - IN REPORT:    Verbal report received from Miriam Short RN(name) on [facility]  being received from EP lab(unit) for routine progression of care      Report consisted of patients Situation, Background, Assessment and   Recommendations(SBAR). Information from the following report(s) Procedure Summary was reviewed with the receiving nurse. Opportunity for questions and clarification was provided. Assessment completed upon patients arrival to unit and care assumed.

## 2019-07-23 NOTE — PROGRESS NOTES
Interpreting services have been requested for procedure on 7/23/19  [facility], hospital  will arrive at 5:30am. Contact phone number is [phone]. [facility] GRAZYNA Emanuel  Patient [email or web address]  c: [phone] / Alondra Duran [address] / Cr, [address] [facility]  [email or web address]. [facility]

## 2019-09-20 PROBLEM — Z12.11 SPECIAL SCREENING FOR MALIGNANT NEOPLASMS, COLON: Status: RESOLVED | Noted: 2019-04-24 | Resolved: 2019-09-20

## 2022-03-19 PROBLEM — Z83.71 FAMILY HISTORY OF COLONIC POLYPS: Status: ACTIVE | Noted: 2019-04-24

## 2022-03-19 PROBLEM — K64.9 HEMORRHOIDS: Status: ACTIVE | Noted: 2019-04-24

## 2022-03-19 PROBLEM — I48.92 ATRIAL FLUTTER (HCC): Status: ACTIVE | Noted: 2019-05-15

## 2022-03-19 PROBLEM — R07.9 CHEST PAIN: Status: ACTIVE | Noted: 2019-05-13

## 2022-03-19 PROBLEM — I34.0 NON-RHEUMATIC MITRAL REGURGITATION: Status: ACTIVE | Noted: 2019-05-15

## 2022-03-19 PROBLEM — K57.30 DIVERTICULOSIS OF COLON: Status: ACTIVE | Noted: 2019-04-24

## 2023-01-01 ENCOUNTER — HOSPITAL ENCOUNTER (EMERGENCY)
Age: 73
Discharge: HOME | End: 2023-01-01
Attending: EMERGENCY MEDICINE
Payer: COMMERCIAL

## 2023-01-01 VITALS
SYSTOLIC BLOOD PRESSURE: 147 MMHG | HEIGHT: 63 IN | HEART RATE: 78 BPM | OXYGEN SATURATION: 96 % | DIASTOLIC BLOOD PRESSURE: 84 MMHG | BODY MASS INDEX: 25.69 KG/M2 | TEMPERATURE: 98.4 F | WEIGHT: 145 LBS | RESPIRATION RATE: 18 BRPM

## 2023-01-01 DIAGNOSIS — J06.9 ACUTE UPPER RESPIRATORY INFECTION: Primary | ICD-10-CM

## 2023-01-01 LAB
FLUAV AG NPH QL IA: NEGATIVE
FLUBV AG NPH QL IA: NEGATIVE
SARS-COV-2 RDRP RESP QL NAA+PROBE: NOT DETECTED
SOURCE: NORMAL
SPECIMEN SOURCE: NORMAL

## 2023-01-01 PROCEDURE — 87804 INFLUENZA ASSAY W/OPTIC: CPT

## 2023-01-01 PROCEDURE — 99283 EMERGENCY DEPT VISIT LOW MDM: CPT

## 2023-01-01 PROCEDURE — 87635 SARS-COV-2 COVID-19 AMP PRB: CPT

## 2023-01-01 ASSESSMENT — ENCOUNTER SYMPTOMS
ABDOMINAL PAIN: 0
VOMITING: 0
PHOTOPHOBIA: 0
RHINORRHEA: 1
BACK PAIN: 0
COLOR CHANGE: 0
CHEST TIGHTNESS: 0
COUGH: 1
VOICE CHANGE: 0
EYE REDNESS: 0
TROUBLE SWALLOWING: 0
NAUSEA: 0

## 2023-01-01 ASSESSMENT — PAIN SCALES - GENERAL: PAINLEVEL_OUTOF10: 0

## 2023-01-01 ASSESSMENT — PAIN - FUNCTIONAL ASSESSMENT: PAIN_FUNCTIONAL_ASSESSMENT: NONE - DENIES PAIN

## 2023-01-01 NOTE — ED PROVIDER NOTES
Emergency Department Provider Note                   PCP:                Eveline Gant DO               Age: 81 y.o. Sex: male     No diagnosis found. DISPOSITION          MDM  Number of Diagnoses or Management Options  Diagnosis management comments: Well-appearing here. Plan for screening swabs including COVID and flu. 11:13 AM  Swabs were negative for COVID and the flu  Considerations of further testing to include chest x-ray or laboratory data, however I do not feel is clinically indicated given patient's overall well appearance  Given general URI precautions and encourage close follow-up with PCP       Amount and/or Complexity of Data Reviewed  Clinical lab tests: ordered and reviewed    Risk of Complications, Morbidity, and/or Mortality  Presenting problems: low  Diagnostic procedures: low  Management options: low    Patient Progress  Patient progress: stable                              Orders Placed This Encounter   Procedures    Rapid influenza A/B antigens    COVID-19, Rapid        Medications - No data to display    New Prescriptions    No medications on file        Niyah Mae is a 81 y.o. male who presents to the Emergency Department with chief complaint of    Chief Complaint   Patient presents with    Cough      Patient presents ER with complaints of fever, cough, runny nose and congestion. Patient states symptoms started yesterday. Denies any chest pain. Denies any abdominal pain or vomiting. The history is provided by the patient. Cough  Cough characteristics:  Productive  Severity:  Mild  Duration:  3 days  Timing:  Constant  Chronicity:  New  Context: not animal exposure and not exposure to allergens    Relieved by:  Nothing  Worsened by:  Nothing  Associated symptoms: rhinorrhea    Associated symptoms: no chest pain, no diaphoresis, no ear fullness, no fever and no rash        Review of Systems   Constitutional:  Negative for diaphoresis, fatigue and fever.    HENT: Positive for congestion and rhinorrhea. Negative for trouble swallowing and voice change. Eyes:  Negative for photophobia and redness. Respiratory:  Positive for cough. Negative for chest tightness. Cardiovascular:  Negative for chest pain and palpitations. Gastrointestinal:  Negative for abdominal pain, nausea and vomiting. Endocrine: Negative for polydipsia, polyphagia and polyuria. Genitourinary:  Negative for urgency. Musculoskeletal:  Negative for back pain and gait problem. Skin:  Negative for color change and rash. Neurological:  Negative for tremors and weakness. Hematological:  Negative for adenopathy. Does not bruise/bleed easily. Psychiatric/Behavioral:  Negative for behavioral problems and confusion. All other systems reviewed and are negative. Past Medical History:   Diagnosis Date    Allergic rhinitis     takes allegra prn    Arrhythmia     Constipation     Enlarged prostate     Hyperlipidemia     Hypertension     no meds    Prostatitis     Shingles     Swedish speaking patient     Stool color black     Diverticulosis        Past Surgical History:   Procedure Laterality Date    APPENDECTOMY  1992    COLONOSCOPY N/A 6/27/2016    Repeat in 10 years; COLONOSCOPY / BMI 23   performed by Bianca Maciel MD at [facility] ENDOSCOPY    COLONOSCOPY FLX DX W/COLLJ [facility] REHAB[facility]ITATION WHEN PFRMD  06/27/2016    Dr. Pearl Valdez in 10 years.     HERNIA REPAIR Right 2016    Dr. Lalo Chavis Left 2006    thumb surgery        Family History   Problem Relation Age of Onset    Hypertension Father     Diabetes Mother     Cancer Brother     Colon Cancer Neg Hx         Social History     Socioeconomic History    Marital status:    Tobacco Use    Smoking status: Never    Smokeless tobacco: Never   Substance and Sexual Activity    Alcohol use: No     Alcohol/week: 0.0 standard drinks    Drug use: No         Tamsulosin     Previous Medications    No medications on file Vitals signs and nursing note reviewed. Patient Vitals for the past 4 hrs:   Temp Pulse Resp BP SpO2   01/01/23 0917 98.4 °F (36.9 °C) 81 19 (!) 148/94 97 %          Physical Exam  Vitals and nursing note reviewed. Constitutional:       General: He is not in acute distress. Appearance: Normal appearance. He is not ill-appearing. HENT:      Head: Normocephalic and atraumatic. Right Ear: External ear normal.      Left Ear: External ear normal.      Nose: Nose normal. No congestion or rhinorrhea. Eyes:      Extraocular Movements: Extraocular movements intact. Pupils: Pupils are equal, round, and reactive to light. Cardiovascular:      Rate and Rhythm: Normal rate and regular rhythm. Pulses: Normal pulses. Heart sounds: Normal heart sounds. Pulmonary:      Effort: Pulmonary effort is normal.      Breath sounds: Normal breath sounds. Abdominal:      General: Abdomen is flat. There is no distension. Palpations: Abdomen is soft. Musculoskeletal:      Cervical back: Normal range of motion and neck supple. Skin:     General: Skin is warm. Coloration: Skin is not jaundiced. Findings: No bruising. Neurological:      General: No focal deficit present. Mental Status: He is alert and oriented to person, place, and time. Cranial Nerves: No cranial nerve deficit. Sensory: No sensory deficit. Motor: No weakness.    Psychiatric:         Mood and Affect: Mood normal.         Behavior: Behavior normal.        Procedures    Results for orders placed or performed during the hospital encounter of 01/01/23   Rapid influenza A/B antigens    Specimen: Nasal Washing   Result Value Ref Range    Influenza A Ag Negative NEG      Influenza B Ag Negative NEG      Source Nasopharyngeal     COVID-19, Rapid    Specimen: Nasopharyngeal   Result Value Ref Range    Source Nasopharyngeal      SARS-CoV-2, Rapid Not detected NOTD          No orders to display Voice dictation software was used during the making of this note. This software is not perfect and grammatical and other typographical errors may be present. This note has not been completely proofread for errors.      Glendia Runner, MD  01/01/23 Jaylen Charles MD  01/01/23 6553

## 2023-01-01 NOTE — DISCHARGE INSTRUCTIONS
Follow-up with your primary care physician  Return to the ER for any new, worsening or life-threatening symptoms

## 2023-01-01 NOTE — ED TRIAGE NOTES
Ambulatory to ED ith c/o head cold, cough and nasal congestion with green mucus. Pt states he had a fever yesterday.

## 2023-01-01 NOTE — ED NOTES
I have reviewed discharge instructions with the patient. The patient verbalized understanding. Patient left ED via Discharge Method: ambulatory to Home with spouse. Opportunity for questions and clarification provided. Patient given 0 scripts. To continue your aftercare when you leave the hospital, you may receive an automated call from our care team to check in on how you are doing. This is a free service and part of our promise to provide the best care and service to meet your aftercare needs.  If you have questions, or wish to unsubscribe from this service please call [phone].   Thank you for Choosing our [facility] Emergency Department.   ;     Sommer Hsu RN  01/01/23 7444